# Patient Record
(demographics unavailable — no encounter records)

---

## 2024-12-02 NOTE — CARDIOLOGY SUMMARY
[___] : [unfilled] [LVEF ___%] : LVEF [unfilled]% [None] : no pulmonary hypertension [Mild] : mild mitral regurgitation [de-identified] : 12/2/2024: Sinus Rhythm with occasional ectopic ventricular beats at 69 beats per minute and poor R wave progression [de-identified] : Exercise nuclear stress test performed at an outside office on 11/17/2017 did not reveal any evidence of infarct or ischemia. [de-identified] : 3/11/2024: Grossly preserved left ventricular ejection fraction. EF is approximately 65%. Moderate concentric left ventricular hypertrophy. There is mild (grade 1) left ventricular diastolic dysfunction. Normal right ventricular cavity size and normal systolic function. Mild biatrial enlargement. Mild to moderate mitral regurgitation. Mild mitral valve stenosis. Mitral annular calcification with thickened and calcified mitral valve leaflets and mildly decreased opening. PASP= 31 mmHg. No echocardiographic evidence of pulmonary hypertension. Mild tricuspid regurgitation. Patent foramen ovale by color flow Doppler. Ascending aorta diameter is mildly dialted, measuring 4.00 cm. Trileaflet aortic valve with normal systolic excursion. There is calcification of the aortic valve leaflets. Trace aortic regurgitation. Trace pericardial effusion.   11/3/2022: Endocardium not well visualized; grossly normal left ventricular systolic function. Left ventricular ejection fraction is estimated at 60 to 65%. Concentric left ventricular remodeling. Mild mitral stenosis. Mild mitral regurgitation. Mild tricuspid regurgitation.  No pulmonary HTN. PASP= 29 mmHg. Mild biatrial enlargement. Mild diastolic dysfunction. The proximal ascending aorta is mildly dilated. [de-identified] : Cardiac CT performed on 2/24/2023: Calcium score of 2.   The proximal left circumflex has partially calcified plaque with associated minimal luminal narrowing.

## 2024-12-02 NOTE — PHYSICAL EXAM
[General Appearance - Well Developed] : well developed [Normal Appearance] : normal appearance [Well Groomed] : well groomed [General Appearance - Well Nourished] : well nourished [No Deformities] : no deformities [General Appearance - In No Acute Distress] : no acute distress [Normal Oral Mucosa] : normal oral mucosa [No Oral Pallor] : no oral pallor [No Oral Cyanosis] : no oral cyanosis [Normal Jugular Venous A Waves Present] : normal jugular venous A waves present [Normal Jugular Venous V Waves Present] : normal jugular venous V waves present [No Jugular Venous Rosa A Waves] : no jugular venous rosa A waves [Respiration, Rhythm And Depth] : normal respiratory rhythm and effort [Exaggerated Use Of Accessory Muscles For Inspiration] : no accessory muscle use [Auscultation Breath Sounds / Voice Sounds] : lungs were clear to auscultation bilaterally [Bowel Sounds] : normal bowel sounds [Abdomen Soft] : soft [Abdomen Tenderness] : non-tender [Abnormal Walk] : normal gait [Nail Clubbing] : no clubbing of the fingernails [Cyanosis, Localized] : no localized cyanosis [Petechial Hemorrhages (___cm)] : no petechial hemorrhages [Skin Color & Pigmentation] : normal skin color and pigmentation [] : no rash [No Skin Ulcers] : no skin ulcer [Oriented To Time, Place, And Person] : oriented to person, place, and time [Affect] : the affect was normal [Mood] : the mood was normal [No Anxiety] : not feeling anxious [Normal Rate] : normal [Rhythm Regular] : regular [Normal S1] : normal S1 [Normal S2] : normal S2 [II] : a grade 2 [___ +] : [unfilled]U+ pitting edema to L ankle [Premature Beats] : regular with premature beats [FreeTextEntry1] : Extraocular muscles intact. Anicteric sclerae. [S3] : no S3 [Right Carotid Bruit] : no bruit heard over the right carotid [Left Carotid Bruit] : no bruit heard over the left carotid [Bruit] : no bruit heard

## 2024-12-02 NOTE — HISTORY OF PRESENT ILLNESS
[FreeTextEntry1] : Patient is an 85 year old woman with a history of HTN, hyperlipidemia, COPD, asthma, prior COVID Infection, and former tobacco use who presents today for follow up of HTN and hyperlipidemia. She experiences intermittent dyspnea with exertion. She states that she experienced muscles aches on Crestor that she feels were debilitating, however, has been able to tolerate Pravastatin along with CoQ10. She states that she has been taking Pravastatin on a daily basis.   She has noticed swelling of her feet with very hot temperatures. She otherwise denies any exertional chest pain, dyspnea at rest, palpitations, headaches, and dizziness. She has chronic dyspnea with exertion.

## 2024-12-02 NOTE — DISCUSSION/SUMMARY
[EKG obtained to assist in diagnosis and management of assessed problem(s)] : EKG obtained to assist in diagnosis and management of assessed problem(s) [FreeTextEntry1] : IMPRESSION: Ms. ALMONTE is a 85 year old woman with a history of HTN, hyperlipidemia, COPD, asthma, COVID infection 1/2022, and former tobacco use who presents today for follow up of HTN and hyperlipidemia.   PLAN: 1.  Her blood pressure is very good, thus she will continue on Irbesartan 300 mg daily and HCTZ 25 mg daily in addition to diet modification.  2. Her LDL cholesterol was improved on her most recent blood work. I have not made any changes at this time. She will modify her diet for now and continue on Pravachol 20 mg daily. Her goal LDL is at least less than 100.  3.   She had a cardiac CT performed about 1 and a half years ago that revealed minimal luminal narrowing of the left circumflex artery.  She had a nuclear stress test performed 7 years ago that did not reveal any evidence of infarct or ischemia.  She had preserved left ventricular ejection fraction on her echocardiogram that was performed 9 months ago.  4. She was in sinus rhythm on her ECG that was performed today with PVCs. She did have ectopy exam.  I have placed a 3-day Zio patch on her today to evaluate the extent of her ectopy.   5. She will continue on ASA 81 mg daily given her likely PAD and carotid atherosclerosis. She will continue to follow with Vascular Cardiology.   6. She will follow up with me in 4 months or sooner should she experience any symptoms in the interim.

## 2024-12-02 NOTE — CARDIOLOGY SUMMARY
[___] : [unfilled] [LVEF ___%] : LVEF [unfilled]% [None] : no pulmonary hypertension [Mild] : mild mitral regurgitation [de-identified] : 12/2/2024: Sinus Rhythm with occasional ectopic ventricular beats at 69 beats per minute and poor R wave progression [de-identified] : Exercise nuclear stress test performed at an outside office on 11/17/2017 did not reveal any evidence of infarct or ischemia. [de-identified] : 3/11/2024: Grossly preserved left ventricular ejection fraction. EF is approximately 65%. Moderate concentric left ventricular hypertrophy. There is mild (grade 1) left ventricular diastolic dysfunction. Normal right ventricular cavity size and normal systolic function. Mild biatrial enlargement. Mild to moderate mitral regurgitation. Mild mitral valve stenosis. Mitral annular calcification with thickened and calcified mitral valve leaflets and mildly decreased opening. PASP= 31 mmHg. No echocardiographic evidence of pulmonary hypertension. Mild tricuspid regurgitation. Patent foramen ovale by color flow Doppler. Ascending aorta diameter is mildly dialted, measuring 4.00 cm. Trileaflet aortic valve with normal systolic excursion. There is calcification of the aortic valve leaflets. Trace aortic regurgitation. Trace pericardial effusion.   11/3/2022: Endocardium not well visualized; grossly normal left ventricular systolic function. Left ventricular ejection fraction is estimated at 60 to 65%. Concentric left ventricular remodeling. Mild mitral stenosis. Mild mitral regurgitation. Mild tricuspid regurgitation.  No pulmonary HTN. PASP= 29 mmHg. Mild biatrial enlargement. Mild diastolic dysfunction. The proximal ascending aorta is mildly dilated. [de-identified] : Cardiac CT performed on 2/24/2023: Calcium score of 2.   The proximal left circumflex has partially calcified plaque with associated minimal luminal narrowing.

## 2024-12-12 NOTE — REVIEW OF SYSTEMS
[Hypertension] : ~T hypertension [Arthralgias] : arthralgias [Fever] : no fever [Fatigue] : no fatigue [Nasal Congestion] : no nasal congestion [Postnasal Drip] : no postnasal drip [Cough] : no cough [Sputum] : not coughing up ~M sputum [Hemoptysis] : no hemoptysis [Wheezing] : no wheezing [Chest Discomfort] : no chest discomfort [Palpitations] : no palpitations [Edema] : ~T edema was not present [Dysuria] : no dysuria [Myalgias] : no myalgias [Rash] : no [unfilled] rash [Easy Bruising] : no ~M tendency for easy bruising [Anxiety] : no anxiety [Diabetes] : no diabetes mellitus [Thyroid Problem] : no thyroid problem [DVT] : no DVT

## 2024-12-12 NOTE — DISCUSSION/SUMMARY
[FreeTextEntry1] : She is an 85 year-old, former smoker (stopped in 1995) with a history of hypertension, hyperlipidemia, S/P COVID January 2022 and moderate COPD.  Impression COPD Small pulmonary nodules -Have been stable  Recommend To continue with Breo and albuterol as needed I will see her again in 6 months, sooner if needed

## 2024-12-12 NOTE — PROCEDURE
[FreeTextEntry1] : PFT 11/25/13: Moderate obstructive airways disease with normal lung volumes. There was a moderate reduction in diffusion. Improvement noted after inhalation of bronchodilator.  PFT 11/13/14: Mild obstructive airways disease. There was air trapping. Diffusion was mildly reduced.  PFT 11/10/17: Mild obstructive airways disease. Lung volumes were normal. Diffusion was moderately reduced.  PFT 1/23/20: Mild obstruction. Mild restriction. DLCO moderately reduced. No change after bronchodilator.   PFT 4/12/24: Mild obstruction.  Moderate restriction.  Moderate reduction in diffusion.  No significant change after inhalation of a bronchodilator.  CT Chest 11/20/17: Centrilobular emphysema. Increased reticular markings mostly in the lower lobes. There was no honeycombing and there was no traction bronchiectasis. There were multiple small nodules the largest measuring 4 mm in the left upper lobe.  CT Chest 9/17/20: Mild centrilobular emphysema. Several small pulmonary nodules, none larger than 3 mm. Minimal bronchiectasis. No adenopathy.  CT Chest 10/4/22: Nodules were stable. Scarring in RML and lingula. Centrilobular emphysema was noted. See report.   CT Chest 2/24/23: No nodules. Mild scarring.   CT Chest 5/6/24: Mild centrilobular emphysema.  Small pulmonary nodules, stable.  Linear atelectasis in the right middle lobe and lingula, stable.  See report.

## 2024-12-12 NOTE — PHYSICAL EXAM
[Well Groomed] : well groomed [General Appearance - Well Nourished] : well nourished [General Appearance - In No Acute Distress] : no acute distress [Neck Appearance] : the appearance of the neck was normal [Heart Rate And Rhythm] : heart rate and rhythm were normal [Heart Sounds] : normal S1 and S2 [Auscultation Breath Sounds / Voice Sounds] : lungs were clear to auscultation bilaterally [Abnormal Walk] : normal gait [Nail Clubbing] : no clubbing of the fingernails [Cyanosis, Localized] : no localized cyanosis [Skin Turgor] : normal skin turgor [] : no rash [No Focal Deficits] : no focal deficits [Oriented To Time, Place, And Person] : oriented to person, place, and time [Impaired Insight] : insight and judgment were intact

## 2024-12-12 NOTE — HISTORY OF PRESENT ILLNESS
[Never] : never [TextBox_4] : She came for a follow-up visit today.  She is feeling well.  No cough, wheezing or shortness of breath.  She is on Breo.  Has not been using albuterol.    No new medical issues. [Difficulty Initiating Sleep] : does not have difficulty initiating sleep [Difficulty Maintaining Sleep] : does not have difficulty maintaining sleep

## 2025-02-04 NOTE — HISTORY OF PRESENT ILLNESS
[FreeTextEntry1] : Patient presents today for high-risk foot care. She has worsening keratotic lesions at the 4th hammertoe bilateral, preulcerative keratotic lesions and onychomycosis of toes 2, 3, 4 bilateral. She cannot care for this herself. She has a foot at risk due to poor circulation. The patient's history and physical has been reviewed and verified with no changes.

## 2025-02-04 NOTE — ASSESSMENT
[FreeTextEntry1] : Impression: Generalized atherosclerosis. IPK. Onychomycosis. Pain in toes.  Treatment:  I manually and mechanically debrided mycotic nails x6 using a small straight nail splitter and rotary gregoria. The nails were aggressively debrided and debulked to make them comfortable in shoe gear. I trimmed keratotic lesions, after prepping with alcohol, without incident. I put aperture pads on.  I gave her tube foam protectors. Discussed shoe gear. she will follow-up in the office for evaluation.

## 2025-02-04 NOTE — PHYSICAL EXAM
[Delayed in the Right Toes] : capillary refills delayed in the right toes [Delayed in the Left Toes] : capillary refills delayed in the left toes [0] : left foot posterior tibialis 0 [1+] : left foot dorsalis pedis 1+ [Vibration Dec.] : diminished vibratory sensation at the level of the toes [Position Sense Dec.] : diminished position sense at the level of the toes [Diminished Throughout Right Foot] : diminished sensation with monofilament testing throughout right foot [Diminished Throughout Left Foot] : diminished sensation with monofilament testing throughout left foot [Ankle Swelling (On Exam)] : not present [FreeTextEntry3] : Absent hair growth. Thin, atrophic skin. The patient has a class finding of Q8-Two Class B findings.  [de-identified] : HAV with bunion, moderate deformity, minimally symptomatic with right 4th hammertoe. Minimally symptomatic.  [FreeTextEntry1] : [place]-Erich monofilament testing absent at the hallux, 1st MPJ and heel bilateral.

## 2025-02-04 NOTE — PHYSICAL EXAM
[Delayed in the Right Toes] : capillary refills delayed in the right toes [Delayed in the Left Toes] : capillary refills delayed in the left toes [0] : left foot posterior tibialis 0 [1+] : left foot dorsalis pedis 1+ [Vibration Dec.] : diminished vibratory sensation at the level of the toes [Position Sense Dec.] : diminished position sense at the level of the toes [Diminished Throughout Right Foot] : diminished sensation with monofilament testing throughout right foot [Diminished Throughout Left Foot] : diminished sensation with monofilament testing throughout left foot [Ankle Swelling (On Exam)] : not present [FreeTextEntry3] : Absent hair growth. Thin, atrophic skin. The patient has a class finding of Q8-Two Class B findings.  [de-identified] : HAV with bunion, moderate deformity, minimally symptomatic with right 4th hammertoe. Minimally symptomatic.  [FreeTextEntry1] : [place]-Erich monofilament testing absent at the hallux, 1st MPJ and heel bilateral.

## 2025-02-04 NOTE — PHYSICAL EXAM
[Delayed in the Right Toes] : capillary refills delayed in the right toes [Delayed in the Left Toes] : capillary refills delayed in the left toes [0] : left foot posterior tibialis 0 [1+] : left foot dorsalis pedis 1+ [Vibration Dec.] : diminished vibratory sensation at the level of the toes [Position Sense Dec.] : diminished position sense at the level of the toes [Diminished Throughout Right Foot] : diminished sensation with monofilament testing throughout right foot [Diminished Throughout Left Foot] : diminished sensation with monofilament testing throughout left foot [Ankle Swelling (On Exam)] : not present [FreeTextEntry3] : Absent hair growth. Thin, atrophic skin. The patient has a class finding of Q8-Two Class B findings.  [de-identified] : HAV with bunion, moderate deformity, minimally symptomatic with right 4th hammertoe. Minimally symptomatic.  [FreeTextEntry1] : [place]-Erich monofilament testing absent at the hallux, 1st MPJ and heel bilateral.

## 2025-04-07 NOTE — HISTORY OF PRESENT ILLNESS
[FreeTextEntry1] : Patient is an 85 year old woman with a history of HTN, hyperlipidemia, COPD, asthma, prior COVID Infection, and former tobacco use who presents today for follow up of HTN and hyperlipidemia. She experiences intermittent dyspnea with exertion. She states that she experienced muscles aches on Crestor that she feels were debilitating, however, has been able to tolerate Pravastatin along with CoQ10. She states that she has been taking Pravastatin on a daily basis.   She has noticed mildly swollen feet by the end of the day that are normal by the next morning.She otherwise denies any exertional chest pain, dyspnea at rest, palpitations, headaches, and dizziness.

## 2025-04-07 NOTE — PHYSICAL EXAM
[General Appearance - Well Developed] : well developed [Normal Appearance] : normal appearance [Well Groomed] : well groomed [General Appearance - Well Nourished] : well nourished [No Deformities] : no deformities [General Appearance - In No Acute Distress] : no acute distress [Normal Oral Mucosa] : normal oral mucosa [No Oral Pallor] : no oral pallor [No Oral Cyanosis] : no oral cyanosis [Normal Jugular Venous A Waves Present] : normal jugular venous A waves present [Normal Jugular Venous V Waves Present] : normal jugular venous V waves present [No Jugular Venous Rosa A Waves] : no jugular venous rosa A waves [Respiration, Rhythm And Depth] : normal respiratory rhythm and effort [Exaggerated Use Of Accessory Muscles For Inspiration] : no accessory muscle use [Auscultation Breath Sounds / Voice Sounds] : lungs were clear to auscultation bilaterally [Bowel Sounds] : normal bowel sounds [Abdomen Soft] : soft [Abdomen Tenderness] : non-tender [Abnormal Walk] : normal gait [Nail Clubbing] : no clubbing of the fingernails [Cyanosis, Localized] : no localized cyanosis [Petechial Hemorrhages (___cm)] : no petechial hemorrhages [Skin Color & Pigmentation] : normal skin color and pigmentation [] : no rash [No Skin Ulcers] : no skin ulcer [Oriented To Time, Place, And Person] : oriented to person, place, and time [Affect] : the affect was normal [Mood] : the mood was normal [No Anxiety] : not feeling anxious [Normal Rate] : normal [Normal S1] : normal S1 [Normal S2] : normal S2 [II] : a grade 2 [Rhythm Regular] : regular [___ +] : bilateral [unfilled]U+ pretibial pitting edema [FreeTextEntry1] : Extraocular muscles intact. Anicteric sclerae. [S3] : no S3 [Right Carotid Bruit] : no bruit heard over the right carotid [Left Carotid Bruit] : no bruit heard over the left carotid [Bruit] : no bruit heard

## 2025-04-07 NOTE — CARDIOLOGY SUMMARY
[___] : [unfilled] [LVEF ___%] : LVEF [unfilled]% [None] : no pulmonary hypertension [Mild] : mild mitral regurgitation [de-identified] : 4/7/2025: Sinus Bradycardia at 59 beats per minute with poor R wave progression, and nonspecific T wave abnormality [de-identified] : 3 day ZIO patch performed on 12/2/2024: Poor baseline. Sinus Rhythm with rare APCs and frequent PVCs.  [de-identified] : Exercise nuclear stress test performed at an outside office on 11/17/2017 did not reveal any evidence of infarct or ischemia. [de-identified] : 3/11/2024: Grossly preserved left ventricular ejection fraction. EF is approximately 65%. Moderate concentric left ventricular hypertrophy. There is mild (grade 1) left ventricular diastolic dysfunction. Normal right ventricular cavity size and normal systolic function. Mild biatrial enlargement. Mild to moderate mitral regurgitation. Mild mitral valve stenosis. Mitral annular calcification with thickened and calcified mitral valve leaflets and mildly decreased opening. PASP= 31 mmHg. No echocardiographic evidence of pulmonary hypertension. Mild tricuspid regurgitation. Patent foramen ovale by color flow Doppler. Ascending aorta diameter is mildly dialted, measuring 4.00 cm. Trileaflet aortic valve with normal systolic excursion. There is calcification of the aortic valve leaflets. Trace aortic regurgitation. Trace pericardial effusion.   11/3/2022: Endocardium not well visualized; grossly normal left ventricular systolic function. Left ventricular ejection fraction is estimated at 60 to 65%. Concentric left ventricular remodeling. Mild mitral stenosis. Mild mitral regurgitation. Mild tricuspid regurgitation.  No pulmonary HTN. PASP= 29 mmHg. Mild biatrial enlargement. Mild diastolic dysfunction. The proximal ascending aorta is mildly dilated. [de-identified] : Cardiac CT performed on 2/24/2023: Calcium score of 2.   The proximal left circumflex has partially calcified plaque with associated minimal luminal narrowing.

## 2025-04-07 NOTE — DISCUSSION/SUMMARY
[EKG obtained to assist in diagnosis and management of assessed problem(s)] : EKG obtained to assist in diagnosis and management of assessed problem(s) [FreeTextEntry1] : IMPRESSION: Ms. ALMONTE is a 85 year old woman with a history of HTN, hyperlipidemia, COPD, asthma, COVID infection 1/2022, and former tobacco use who presents today for follow up of HTN and hyperlipidemia.   PLAN: 1.  Her blood pressure is very good, thus she will continue on Irbesartan 300 mg daily and HCTZ 25 mg daily in addition to diet modification.  2. Her LDL cholesterol was improved on her most recent blood work. I have not made any changes at this time. She will modify her diet for now and continue on Pravachol 20 mg daily. Her goal LDL is at least less than 100. She will have a CMP and lipid profile when she follows up with you next month. 3.   She had a cardiac CT performed 2/2023 that revealed minimal luminal narrowing of the left circumflex artery.  She had a nuclear stress test performed 7 and a half years ago that did not reveal any evidence of infarct or ischemia.  She had preserved left ventricular ejection fraction on her echocardiogram that was performed 11 months ago.  4. She was in sinus rhythm on her ECG that was performed today without any ectopy. SHe also had no ectopy on exam.  5. She will continue on ASA 81 mg daily given her likely PAD and carotid atherosclerosis. She will continue to follow with Vascular Cardiology.   6. She will follow up with me in 4 months or sooner should she experience any symptoms in the interim. 7. She had frequent PVCs on her Zio patch, however, she had no ectopy on exam or on her ECG.  She also has not experienced any palpitations.  Given that she has been feeling well, we have decided to hold off on any medical therapy.  I have asked her to schedule an echocardiogram to evaluate her left ventricular ejection fraction.  If her ejection fraction is normal, then we will continue following her with serial EKGs.

## 2025-05-13 NOTE — HISTORY OF PRESENT ILLNESS
[de-identified] : COMES FOR CPE  3 DAYS AGO DEVELOPED MILD SORE THRAOTA AND COUGH - MALAISE ;NO FEVER;DECREASED APPETITE ,NO SOB

## 2025-05-13 NOTE — ASSESSMENT
[FreeTextEntry1] : CPE OF 85 Y OLD FEM WITH PMX OF COPD ,ASHD= LABS  ACUTE URI = RESP VIRAL SCREEN ;ALBUTEROL INH EVERY 6 HS PRNA ND BENZONATATE 200MG BID  CONSTIPATION = TITRATE MIRALAX AS NEED IT  RTO 4M = 9/25 [Vaccines Reviewed] : Immunizations reviewed today. Please see immunization details in the vaccine log within the immunization flowsheet.

## 2025-05-13 NOTE — REVIEW OF SYSTEMS
[Fatigue] : fatigue [Sore Throat] : sore throat [Cough] : cough [Constipation] : constipation [Negative] : Heme/Lymph

## 2025-05-13 NOTE — HEALTH RISK ASSESSMENT
[Good] : ~his/her~  mood as  good [No] : In the past 12 months have you used drugs other than those required for medical reasons? No [No falls in past year] : Patient reported no falls in the past year [Little interest or pleasure doing things] : 1) Little interest or pleasure doing things [Feeling down, depressed, or hopeless] : 2) Feeling down, depressed, or hopeless [0] : 2) Feeling down, depressed, or hopeless: Not at all (0) [PHQ-2 Negative - No further assessment needed] : PHQ-2 Negative - No further assessment needed [Yes] : takes [Never] : Never [NO] : No [Patient reported mammogram was normal] : Patient reported mammogram was normal [None] : None [With Family] : lives with family [Retired] : retired [High School] : high school [Single] : single [Feels Safe at Home] : Feels safe at home [Fully functional (bathing, dressing, toileting, transferring, walking, feeding)] : Fully functional (bathing, dressing, toileting, transferring, walking, feeding) [Fully functional (using the telephone, shopping, preparing meals, housekeeping, doing laundry, using] : Fully functional and needs no help or supervision to perform IADLs (using the telephone, shopping, preparing meals, housekeeping, doing laundry, using transportation, managing medications and managing finances) [Reports normal functional visual acuity (ie: able to read med bottle)] : Reports normal functional visual acuity [Smoke Detector] : smoke detector [Carbon Monoxide Detector] : carbon monoxide detector [Safety elements used in home] : safety elements used in home [Seat Belt] :  uses seat belt [Sunscreen] : uses sunscreen [Travel to Developing Areas] : travel to developing areas [de-identified] : pt confirms confirms being physycally active  [de-identified] : pt confirms having a healthy diet  [Change in mental status noted] : No change in mental status noted [Language] : denies difficulty with language [Behavior] : denies difficulty with behavior [Learning/Retaining New Information] : denies difficulty learning/retaining new information [Handling Complex Tasks] : denies difficulty handling complex tasks [Reasoning] : denies difficulty with reasoning [Spatial Ability and Orientation] : denies difficulty with spatial ability and orientation [Sexually Active] : not sexually active [High Risk Behavior] : no high risk behavior [Reports changes in hearing] : Reports no changes in hearing [Reports changes in vision] : Reports no changes in vision [Reports changes in dental health] : Reports no changes in dental health [TB Exposure] : is not being exposed to tuberculosis [Caregiver Concerns] : does not have caregiver concerns

## 2025-05-13 NOTE — PHYSICAL EXAM
[No Acute Distress] : no acute distress [Normal Sclera/Conjunctiva] : normal sclera/conjunctiva [Normal Outer Ear/Nose] : the outer ears and nose were normal in appearance [No JVD] : no jugular venous distention [Normal Rhythm/Effort] : normal respiratory rhythm and effort [Dry Cough] : a dry cough was heard [Paroxysmal Cough] : a paroxysmal cough was heard [Decreased Breath Sounds] : breath sounds were decreased diffusely [Normal to Percussion] : the lungs were normal to percussion [Normal] : palpation of the chest was normal [Normal Rate] : normal rate  [Regular Rhythm] : with a regular rhythm [No Edema] : there was no peripheral edema [Soft] : abdomen soft [Non Tender] : non-tender [Normal Bowel Sounds] : normal bowel sounds [Normal Anterior Cervical Nodes] : no anterior cervical lymphadenopathy [No CVA Tenderness] : no CVA  tenderness [No Rash] : no rash [Coordination Grossly Intact] : coordination grossly intact [No Focal Deficits] : no focal deficits [Alert and Oriented x3] : oriented to person, place, and time

## 2025-06-04 NOTE — ASSESSMENT
[FreeTextEntry1] : Impression: Onychomycosis. Pain. IPK. Diabetes with neuropathy.  Treatment: I manually and mechanically debrided mycotic nails x5 using a small straight nail splitter and rotary gregoria. The nails were aggressively debrided and debulked to make them comfortable in shoe gear. After prepping with alcohol, I enucleated the keratotic lesions x2 without incident. I discussed diabetic foot care. I put aperture pads on. Discussed shoe gear. Follow-up as needed.

## 2025-06-04 NOTE — PHYSICAL EXAM
[Ankle Swelling (On Exam)] : not present [Delayed in the Right Toes] : capillary refills delayed in the right toes [Delayed in the Left Toes] : capillary refills delayed in the left toes [0] : left foot posterior tibialis 0 [1+] : left foot dorsalis pedis 1+ [FreeTextEntry3] : Absent hair growth. Thin, atrophic skin. The patient has a class finding of Q8-Two Class B findings.  [de-identified] : HAV with bunion, moderate deformity, minimally symptomatic with semi-rigid 4th hammertoes. Neuropathy. Minor intrinsic atrophy. [Vibration Dec.] : diminished vibratory sensation at the level of the toes [Position Sense Dec.] : diminished position sense at the level of the toes [Diminished Throughout Right Foot] : diminished sensation with monofilament testing throughout right foot [Diminished Throughout Left Foot] : diminished sensation with monofilament testing throughout left foot [FreeTextEntry1] : [place]-Eirch monofilament testing absent at the hallux, 1st MPJ and heel bilateral.  Paresthesias. The patient has a class finding of Q9-One Class B and 2 Class C findings.

## 2025-06-04 NOTE — HISTORY OF PRESENT ILLNESS
[FreeTextEntry1] : Patient presents today for high-risk foot care due to neuropathy. Her A1c is 6.2. Fasting sugar is under good control. She complains of hammertoes and onychomycotic nails that she cannot care for herself.  They are painful despite neuropathy. The patient's history and physical has been reviewed and verified with no changes.

## 2025-06-04 NOTE — PHYSICAL EXAM
[Ankle Swelling (On Exam)] : not present [Delayed in the Right Toes] : capillary refills delayed in the right toes [Delayed in the Left Toes] : capillary refills delayed in the left toes [0] : left foot posterior tibialis 0 [1+] : left foot dorsalis pedis 1+ [FreeTextEntry3] : Absent hair growth. Thin, atrophic skin. The patient has a class finding of Q8-Two Class B findings.  [de-identified] : HAV with bunion, moderate deformity, minimally symptomatic with semi-rigid 4th hammertoes. Neuropathy. Minor intrinsic atrophy. [Vibration Dec.] : diminished vibratory sensation at the level of the toes [Position Sense Dec.] : diminished position sense at the level of the toes [Diminished Throughout Right Foot] : diminished sensation with monofilament testing throughout right foot [Diminished Throughout Left Foot] : diminished sensation with monofilament testing throughout left foot [FreeTextEntry1] : [place]-E[place] monofilament testing absent at the hallux, 1st MPJ and heel bilateral.  Paresthesias. The patient has a class finding of Q9-One Class B and 2 Class C findings.

## 2025-06-19 NOTE — REVIEW OF SYSTEMS
[Hypertension] : ~T hypertension [Arthralgias] : arthralgias [Fatigue] : no fatigue [Nasal Congestion] : no nasal congestion [Cough] : no cough [Sputum] : not coughing up ~M sputum [Hemoptysis] : no hemoptysis [Dyspnea] : no dyspnea [Wheezing] : no wheezing [Chest Discomfort] : no chest discomfort [Palpitations] : no palpitations [Edema] : ~T edema was not present [Reflux] : no reflux [Dysuria] : no dysuria [Myalgias] : no myalgias [Rash] : no [unfilled] rash [Easy Bruising] : no ~M tendency for easy bruising [Anxiety] : no anxiety [Diabetes] : no diabetes mellitus [Thyroid Problem] : no thyroid problem [DVT] : no DVT

## 2025-06-19 NOTE — HISTORY OF PRESENT ILLNESS
[Never] : never [TextBox_4] : 6/19/2025: She was having more coughing and chest congestion.  She started using albuterol again and is feeling better.  Prior to that she was only on Breo.  The cough has improved. [Difficulty Initiating Sleep] : does not have difficulty initiating sleep

## 2025-06-19 NOTE — DISCUSSION/SUMMARY
[FreeTextEntry1] : She is an 85 year-old, former smoker (stopped in 1995) with a history of hypertension, hyperlipidemia, S/P COVID January 2022 and moderate COPD.  Impression COPD -Not active at the present time Small pulmonary nodules -Have been stable on multiple CT scans  Recommend To continue with Breo and albuterol as needed - Medications renewed I will see her again in 6 months, sooner if needed   Time spent preparing for the visit, interviewing and examining the patient, reviewing data and imaging, discussing the recommendations with the patient and completing documentation was 31 minutes excluding separate billable services.

## 2025-06-23 NOTE — HISTORY OF PRESENT ILLNESS
[FreeTextEntry1] : Very pleasant 85 year old woman who presents for evaluation of microscopic hematuria found on urinalysis to 41 TBC/hpf . She reports no history of gross hematuria.  No flank pain. No suprapubic pain. No dysuria.  No urinary frequency, urgency. No history of urinary tract infections or renal impairment. No aggravating or alleviating factors that she knows of contributing to hematuria.  No family history of  malignancy.  No family history of nephrolithiasis.  She previously smoked very heavily but quit 35 years ago.

## 2025-06-23 NOTE — ASSESSMENT
[FreeTextEntry1] : Very pleasant 85-year-old female with microscopic hematuria -urinalysis demonstrates 41 red blood cells per high-powered field; repeat -urine culture -urine cytology -CT Urogram -cystoscopy -We discussed AUA guidelines regarding risk stratification for microscopic hematuria -Extensive discussion of the potential etiologies of hematuria, as well as the need to complete full work up for evaluation of cancer or other  sources of hematuria.  Patient understands and wishes to proceed.  Patient is being seen today for evaluation and management of a chronic and longitudinal ongoing condition and I am the primary treating physician

## 2025-06-23 NOTE — PHYSICAL EXAM
[Normal Appearance] : normal appearance [Well Groomed] : well groomed [General Appearance - In No Acute Distress] : no acute distress [Edema] : no peripheral edema [Respiration, Rhythm And Depth] : normal respiratory rhythm and effort [Exaggerated Use Of Accessory Muscles For Inspiration] : no accessory muscle use [Abdomen Soft] : soft [Abdomen Tenderness] : non-tender [Costovertebral Angle Tenderness] : no ~M costovertebral angle tenderness [Normal Station and Gait] : the gait and station were normal for the patient's age [] : no rash [No Focal Deficits] : no focal deficits [Affect] : the affect was normal [Oriented To Time, Place, And Person] : oriented to person, place, and time [Mood] : the mood was normal [No Palpable Adenopathy] : no palpable adenopathy

## 2025-07-16 NOTE — HISTORY OF PRESENT ILLNESS
[FreeTextEntry1] : Very pleasant 86year old woman who presents for follow-up of microscopic hematuria found on urinalysis to 41 RBC/hpf . She reports no history of gross hematuria.  No flank pain. No suprapubic pain. No dysuria.  No urinary frequency, urgency. No history of urinary tract infections or renal impairment.  She underwent a CT scan recently which demonstrated no kidney stones, hydronephrosis, nor renal masses but it did demonstrate small renal cysts and lesions too small to characterize.  It also demonstrated mild bladder wall thickening.  She underwent a cystoscopy today which demonstrated a trabeculated bladder but no urothelial lesions in the bladder.  No other complaints.

## 2025-07-16 NOTE — ASSESSMENT
[FreeTextEntry1] : Very pleasant 86-year-old woman who presents for follow-up of microscopic hematuria -Repeat urinalysis demonstrated 4 red blood cells per high-powered field (as high as 41 red blood cells per high-powered field in November 2025) - Urine cytology negative -Urine culture negative - CT images reviewed demonstrating no kidney stones, hydronephrosis, nor renal masses but it does demonstrate a number of small renal cysts and lesions too small to characterize, but likely representing additional cysts - Cystoscopy today demonstrates no urothelial lesions in the bladder but it does demonstrate intra slightly trabeculated bladder - Repeat urine studies in 6 months and follow-up at that time  Patient is being seen today for evaluation and management of a chronic and longitudinal ongoing condition and I am the primary treating physician   I have spent 21 minutes on this encounter, exclusive of separately billed services